# Patient Record
Sex: FEMALE
[De-identification: names, ages, dates, MRNs, and addresses within clinical notes are randomized per-mention and may not be internally consistent; named-entity substitution may affect disease eponyms.]

---

## 2018-07-23 ENCOUNTER — HOSPITAL ENCOUNTER (EMERGENCY)
Dept: HOSPITAL 14 - H.ER | Age: 23
Discharge: HOME | End: 2018-07-23
Payer: MEDICAID

## 2018-07-23 VITALS
RESPIRATION RATE: 18 BRPM | DIASTOLIC BLOOD PRESSURE: 74 MMHG | HEART RATE: 75 BPM | OXYGEN SATURATION: 98 % | SYSTOLIC BLOOD PRESSURE: 110 MMHG | TEMPERATURE: 98.3 F

## 2018-07-23 DIAGNOSIS — R30.0: Primary | ICD-10-CM

## 2018-07-23 NOTE — ED PDOC
HPI: Female  Pain


Time Seen by Provider: 07/23/18 16:05


Chief Complaint (Nursing): Female Genitourinary


Chief Complaint (Provider): Burning on urination x 1 week


History Per: Patient


History/Exam Limitations: no limitations


Onset/Duration Of Symptoms: Days


Current Symptoms Are (Timing): Still Present


Additional Complaint(s): 





21 yo female with no medical problems presents with burning on urination x 1 

week. Pt denies fever/chills. Pt denies abdominal pain or back pain. Pt denies N

/V. Pt denies vaginal discharge. Pt also states her menses is 5 days late and 

she has not taken a pregnancy test yet. 





Past Medical History





- Family History


Family History: States: Unknown Family Hx





- Allergies


Allergies/Adverse Reactions: 


 Allergies











Allergy/AdvReac Type Severity Reaction Status Date / Time


 


No Known Allergies Allergy   Verified 07/12/15 16:58














Physical Exam





- Reviewed


Nursing Documentation Reviewed: Yes


Vital Signs Reviewed: Yes





- Physical Exam


Appears: Positive for: Well, Non-toxic, No Acute Distress


Head Exam: Positive for: ATRAUMATIC, NORMAL INSPECTION, NORMOCEPHALIC


Skin: Positive for: Normal Color, Warm, DRY


Eye Exam: Positive for: Normal appearance


ENT: Positive for: Normal ENT Inspection


Neck: Positive for: Normal, Painless ROM


Cardiovascular/Chest: Positive for: Regular Rate, Rhythm


Respiratory: Positive for: Normal Breath Sounds.  Negative for: Accessory 

Muscle Use, Respiratory Distress


Back: Positive for: Normal Inspection


Extremity: Positive for: Normal ROM


Neurologic/Psych: Positive for: Alert, Oriented





Medical Decision Making


Medical Decision Making: 





Pregnancy test (-) 


urine normal. 


Urine sent for culture and GC/chlamydia





Discussed f/u with GYN. 





Disposition





- Clinical Impression


Clinical Impression: 


 Dysuria








- Patient ED Disposition


Is Patient to be Admitted: No





- Disposition


[facility] 


[facility]


Disposition: Routine/Home


Disposition Time: 17:32


Condition: STABLE


Instructions:  Dysuria, Adult (DC)


Forms:  [facility] (English)

## 2018-09-20 ENCOUNTER — HOSPITAL ENCOUNTER (EMERGENCY)
Dept: HOSPITAL 14 - H.ER | Age: 23
LOS: 1 days | Discharge: HOME | End: 2018-09-21
Payer: SELF-PAY

## 2018-09-20 VITALS — OXYGEN SATURATION: 100 %

## 2018-09-20 DIAGNOSIS — R51: ICD-10-CM

## 2018-09-20 DIAGNOSIS — O23.40: ICD-10-CM

## 2018-09-20 DIAGNOSIS — O21.9: Primary | ICD-10-CM

## 2018-09-20 PROCEDURE — 85025 COMPLETE CBC W/AUTO DIFF WBC: CPT

## 2018-09-20 PROCEDURE — 81025 URINE PREGNANCY TEST: CPT

## 2018-09-20 PROCEDURE — 87086 URINE CULTURE/COLONY COUNT: CPT

## 2018-09-20 PROCEDURE — 84702 CHORIONIC GONADOTROPIN TEST: CPT

## 2018-09-20 PROCEDURE — 81003 URINALYSIS AUTO W/O SCOPE: CPT

## 2018-09-20 PROCEDURE — 80053 COMPREHEN METABOLIC PANEL: CPT

## 2018-09-20 PROCEDURE — 99282 EMERGENCY DEPT VISIT SF MDM: CPT

## 2018-09-21 VITALS
TEMPERATURE: 98.2 F | SYSTOLIC BLOOD PRESSURE: 114 MMHG | DIASTOLIC BLOOD PRESSURE: 65 MMHG | RESPIRATION RATE: 16 BRPM | HEART RATE: 61 BPM

## 2018-09-21 LAB
ALBUMIN SERPL-MCNC: 3.9 G/DL (ref 3.5–5)
ALBUMIN/GLOB SERPL: 1.2 {RATIO} (ref 1–2.1)
ALT SERPL-CCNC: 36 U/L (ref 9–52)
AST SERPL-CCNC: 26 U/L (ref 14–36)
BACTERIA #/AREA URNS HPF: (no result) /[HPF]
BASOPHILS # BLD AUTO: 0 K/UL (ref 0–0.2)
BASOPHILS NFR BLD: 0.3 % (ref 0–2)
BILIRUB UR-MCNC: NEGATIVE MG/DL
BUN SERPL-MCNC: 8 MG/DL (ref 7–17)
CALCIUM SERPL-MCNC: 9.3 MG/DL (ref 8.4–10.2)
COLOR UR: YELLOW
EOSINOPHIL # BLD AUTO: 0.2 K/UL (ref 0–0.7)
EOSINOPHIL NFR BLD: 2.5 % (ref 0–4)
ERYTHROCYTE [DISTWIDTH] IN BLOOD BY AUTOMATED COUNT: 13.4 % (ref 11.5–14.5)
GFR NON-AFRICAN AMERICAN: > 60
GLUCOSE UR STRIP-MCNC: (no result) MG/DL
HGB BLD-MCNC: 12.1 G/DL (ref 12–16)
LEUKOCYTE ESTERASE UR-ACNC: (no result) LEU/UL
LYMPHOCYTES # BLD AUTO: 3.2 K/UL (ref 1–4.3)
LYMPHOCYTES NFR BLD AUTO: 34 % (ref 20–40)
MCH RBC QN AUTO: 30.3 PG (ref 27–31)
MCHC RBC AUTO-ENTMCNC: 34.2 G/DL (ref 33–37)
MCV RBC AUTO: 88.5 FL (ref 81–99)
MONOCYTES # BLD: 0.7 K/UL (ref 0–0.8)
MONOCYTES NFR BLD: 7.5 % (ref 0–10)
NEUTROPHILS # BLD: 5.2 K/UL (ref 1.8–7)
NEUTROPHILS NFR BLD AUTO: 55.7 % (ref 50–75)
NRBC BLD AUTO-RTO: 0 % (ref 0–0)
PH UR STRIP: 6 [PH] (ref 5–8)
PLATELET # BLD: 186 K/UL (ref 130–400)
PMV BLD AUTO: 8.5 FL (ref 7.2–11.7)
PROT UR STRIP-MCNC: 30 MG/DL
RBC # BLD AUTO: 3.99 MIL/UL (ref 3.8–5.2)
RBC # UR STRIP: NEGATIVE /UL
SP GR UR STRIP: 1.03 (ref 1–1.03)
SQUAMOUS EPITHIAL: 6 /HPF (ref 0–5)
URINE CLARITY: (no result)
UROBILINOGEN UR-MCNC: (no result) MG/DL (ref 0.2–1)
WBC # BLD AUTO: 9.4 K/UL (ref 4.8–10.8)

## 2018-09-21 NOTE — ED PDOC
HPI:  Headache


Time Seen by Provider: 09/20/18 23:56


Chief Complaint (Nursing): Headache


Chief Complaint (Provider): headache


History Per: Patient


History/Exam Limitations: no limitations


Onset/Duration Of Symptoms: Days (2 weeks), Waxing/Waning


Current Symptoms Are (Timing): Still Present


Quality: "Pain"


Associated Symptoms: Photophobia, Nausea, Vomiting


Additional Complaint(s): 





21 y/o female presents for evaluation of intermittent headaches x 2 weeks.  

Headache worsened by light, noise, and movement when present. Patient also 

reports nausea, vomiting, and dizziness; which comes with and without headache 

presentation.  Denies fever, neck pain, vision changes, extremity numbness/

weakness, chest pain, shortness of breath, palpitations, abdominal pain, 

urinary symptoms, vaginal bleeding/discharge. 





Past Medical History


Reviewed: Historical Data, Nursing Documentation, Vital Signs


Vital Signs: 





 Last Vital Signs











Temp  98.4 F   09/20/18 23:24


 


Pulse  69   09/20/18 23:24


 


Resp  18   09/20/18 23:24


 


BP  124/78   09/20/18 23:24


 


Pulse Ox  100   09/20/18 23:24














- Medical History


PMH: No Chronic Diseases





- Surgical History


Surgical History: Appendectomy, Tonsillectomy





- Family History


Family History: States: Unknown Family Hx





- Home Medications


Home Medications: 


 Ambulatory Orders











 Medication  Instructions  Recorded


 


Doxylamine/Pyridoxine HCl (B6) 1 - 2 each PO HS #16 tablet. 09/21/18





[Meaghan Roberson 10-10 mg Tablet]  


 


Nitrofurantoin Macrocrystals 100 mg PO BID #13 cap 09/21/18





[Macrobid]  














- Allergies


Allergies/Adverse Reactions: 


 Allergies











Allergy/AdvReac Type Severity Reaction Status Date / Time


 


No Known Allergies Allergy   Verified 09/20/18 23:24














Review of Systems


ROS Statement: Except As Marked, All Systems Reviewed And Found Negative


Gastrointestinal: Positive for: Nausea, Vomiting


Neurological: Positive for: Headache





Physical Exam





- Reviewed


Nursing Documentation Reviewed: Yes


Vital Signs Reviewed: Yes





- Physical Exam


Appears: Positive for: Well, Non-toxic, No Acute Distress


Head Exam: Positive for: ATRAUMATIC, NORMAL INSPECTION, NORMOCEPHALIC


Skin: Positive for: Normal Color


Eye Exam: Positive for: Normal appearance, EOMI, PERRL


ENT: Positive for: Normal ENT Inspection


Cardiovascular/Chest: Positive for: Regular Rate, Rhythm


Respiratory: Positive for: Normal Breath Sounds


Gastrointestinal/Abdominal: Positive for: Normal Exam


Back: Positive for: Normal Inspection


Extremity: Positive for: Normal ROM


Neurologic/Psych: Positive for: Alert, Oriented (x3)





- Laboratory Results


Result Diagrams: 


 09/21/18 02:00





 09/21/18 02:00





- ECG


O2 Sat by Pulse Oximetry: 100





- Progress


ED Course And Treament: 


labs, urine, IV fluids, IV zofran, PO Tylenol





Patient notified of +pregnancy


States LMP approx 4 weeks ago





On re-eval, patient states she is feeling better.  Tolerating PO


Patient educated on findings discharged with rx Macrobid (dose given in ED), 

Meaghan


Advised follow up Ob/Gyn in 2-3 days


FLuids.  [place] diet


Return precautions given





Patient demonstrates full understanding of discharge instructions.


Patient requires no further intervention in the ED and is stable for discharge 

at this time











Disposition





- Clinical Impression


Clinical Impression: 


 Headache, Nausea and vomiting during pregnancy, UTI in pregnancy








- Patient ED Disposition


Is Patient to be Admitted: No


Counseled Patient/Family Regarding: Studies Performed, Diagnosis, Need For 

Followup, Rx Given





- Disposition


Referrals[facility] 


[facility]


Disposition: Routine/Home


Disposition Time: 04:24


Condition: IMPROVED


Prescriptions: 


Doxylamine/Pyridoxine HCl (B6) [Diclegis Dr 10-10 mg Tablet] 1 - 2 each PO HS #

16 tablet.


Nitrofurantoin Macrocrystals [Macrobid] 100 mg PO BID #13 cap


Instructions:  Headache, Adult, Nausea and Vomiting of Pregnancy


Forms:  [facility] (English)

## 2018-09-26 ENCOUNTER — HOSPITAL ENCOUNTER (EMERGENCY)
Dept: HOSPITAL 14 - H.ER | Age: 23
LOS: 1 days | Discharge: HOME | End: 2018-09-27
Payer: SELF-PAY

## 2018-09-26 VITALS — OXYGEN SATURATION: 99 %

## 2018-09-26 DIAGNOSIS — O26.899: ICD-10-CM

## 2018-09-26 DIAGNOSIS — O21.1: Primary | ICD-10-CM

## 2018-09-26 LAB
ALBUMIN SERPL-MCNC: 3.6 G/DL (ref 3.5–5)
ALBUMIN/GLOB SERPL: 1.2 {RATIO} (ref 1–2.1)
ALT SERPL-CCNC: 42 U/L (ref 9–52)
AST SERPL-CCNC: 35 U/L (ref 14–36)
BACTERIA #/AREA URNS HPF: (no result) /[HPF]
BASOPHILS # BLD AUTO: 0 K/UL (ref 0–0.2)
BASOPHILS NFR BLD: 0.3 % (ref 0–2)
BILIRUB UR-MCNC: NEGATIVE MG/DL
BUN SERPL-MCNC: 9 MG/DL (ref 7–17)
CALCIUM SERPL-MCNC: 9.1 MG/DL (ref 8.4–10.2)
COLOR UR: YELLOW
EOSINOPHIL # BLD AUTO: 0.2 K/UL (ref 0–0.7)
EOSINOPHIL NFR BLD: 2.7 % (ref 0–4)
ERYTHROCYTE [DISTWIDTH] IN BLOOD BY AUTOMATED COUNT: 13.4 % (ref 11.5–14.5)
GFR NON-AFRICAN AMERICAN: > 60
GLUCOSE UR STRIP-MCNC: (no result) MG/DL
HGB BLD-MCNC: 11.1 G/DL (ref 12–16)
LEUKOCYTE ESTERASE UR-ACNC: (no result) LEU/UL
LYMPHOCYTES # BLD AUTO: 2.3 K/UL (ref 1–4.3)
LYMPHOCYTES NFR BLD AUTO: 28.1 % (ref 20–40)
MCH RBC QN AUTO: 30.7 PG (ref 27–31)
MCHC RBC AUTO-ENTMCNC: 34.7 G/DL (ref 33–37)
MCV RBC AUTO: 88.4 FL (ref 81–99)
MONOCYTES # BLD: 0.7 K/UL (ref 0–0.8)
MONOCYTES NFR BLD: 8 % (ref 0–10)
NEUTROPHILS # BLD: 5 K/UL (ref 1.8–7)
NEUTROPHILS NFR BLD AUTO: 60.9 % (ref 50–75)
NRBC BLD AUTO-RTO: 0 % (ref 0–0)
PH UR STRIP: 6 [PH] (ref 5–8)
PLATELET # BLD: 170 K/UL (ref 130–400)
PMV BLD AUTO: 8.9 FL (ref 7.2–11.7)
PROT UR STRIP-MCNC: NEGATIVE MG/DL
RBC # BLD AUTO: 3.62 MIL/UL (ref 3.8–5.2)
RBC # UR STRIP: NEGATIVE /UL
SP GR UR STRIP: 1.02 (ref 1–1.03)
SQUAMOUS EPITHIAL: 2 /HPF (ref 0–5)
URINE CLARITY: (no result)
UROBILINOGEN UR-MCNC: (no result) MG/DL (ref 0.2–1)
WBC # BLD AUTO: 8.2 K/UL (ref 4.8–10.8)

## 2018-09-26 PROCEDURE — 80053 COMPREHEN METABOLIC PANEL: CPT

## 2018-09-26 PROCEDURE — 96361 HYDRATE IV INFUSION ADD-ON: CPT

## 2018-09-26 PROCEDURE — 99283 EMERGENCY DEPT VISIT LOW MDM: CPT

## 2018-09-26 PROCEDURE — 96374 THER/PROPH/DIAG INJ IV PUSH: CPT

## 2018-09-26 PROCEDURE — 81003 URINALYSIS AUTO W/O SCOPE: CPT

## 2018-09-26 PROCEDURE — 76817 TRANSVAGINAL US OBSTETRIC: CPT

## 2018-09-26 PROCEDURE — 96375 TX/PRO/DX INJ NEW DRUG ADDON: CPT

## 2018-09-26 PROCEDURE — 85025 COMPLETE CBC W/AUTO DIFF WBC: CPT

## 2018-09-26 PROCEDURE — 87086 URINE CULTURE/COLONY COUNT: CPT

## 2018-09-26 PROCEDURE — 84702 CHORIONIC GONADOTROPIN TEST: CPT

## 2018-09-26 PROCEDURE — 86850 RBC ANTIBODY SCREEN: CPT

## 2018-09-26 PROCEDURE — 86900 BLOOD TYPING SEROLOGIC ABO: CPT

## 2018-09-26 NOTE — ED PDOC
HPI:Nausea, Vomiting, Diarrhea


Time Seen by Provider: 09/26/18 22:20


Chief Complaint (Nursing): GI Problem


Chief Complaint (Provider): pregnant: nausea/vomiting


History Per: Patient


History/Exam Limitations: no limitations


Onset/Duration Of Symptoms: Days


Current Symptoms Are (Timing): Still Present


Additional Complaint(s): 





23 y/o female, approximately 6 weeks pregnant, presents for evaluation of 

ongoing nausea, vomiting.  Patient was seen here for same last week and 

prescribed diclegis; states it was helping but she finished it and now symptoms 

have returned.  Patient also reports associated abdominal pain.  Denies fever, 

headache, dizziness, chest pain, shortness of breath, palpitations, changes in 

bowel movements, urinary symptoms, vaginal bleeding/discharge. 


Patient has appt at Ob/Gyn clinic 10/20.








Past Medical History


Reviewed: Historical Data, Nursing Documentation, Vital Signs


Vital Signs: 


                                Last Vital Signs











Temp  99.1 F   09/26/18 22:13


 


Pulse  80   09/26/18 22:13


 


Resp  16   09/26/18 22:13


 


BP  120/75   09/26/18 22:13


 


Pulse Ox  99   09/26/18 22:13














- Medical History


PMH: No Chronic Diseases





- Surgical History


Surgical History: Appendectomy, Tonsillectomy





- Family History


Family History: States: Unknown Family Hx





- Home Medications


Home Medications: 


                                Ambulatory Orders











 Medication  Instructions  Recorded


 


Doxylamine/Pyridoxine HCl (B6) 1 - 2 each PO HS #16 tablet. 09/21/18





[Meaghan Roberson 10-10 mg Tablet]  


 


Nitrofurantoin Macrocrystals 100 mg PO BID #13 cap 09/21/18





[Macrobid]  


 


Cefdinir [Omnicef] 300 mg PO BID #10 cap 09/24/18


 


Doxylamine/Pyridoxine HCl (B6) 1 - 2 each PO HS #16 tablet. 09/27/18





[Meaghan Roberson 10-10 mg Tablet]  














- Allergies


Allergies/Adverse Reactions: 


                                    Allergies











Allergy/AdvReac Type Severity Reaction Status Date / Time


 


No Known Allergies Allergy   Verified 09/26/18 22:12














Review of Systems


ROS Statement: Except As Marked, All Systems Reviewed And Found Negative


Gastrointestinal: Positive for: Nausea, Vomiting, Abdominal Pain





Physical Exam





- Reviewed


Nursing Documentation Reviewed: Yes


Vital Signs Reviewed: Yes





- Physical Exam


Appears: Positive for: Well, Non-toxic, No Acute Distress


Head Exam: Positive for: ATRAUMATIC, NORMAL INSPECTION, NORMOCEPHALIC


Skin: Positive for: Normal Color


Eye Exam: Positive for: Normal appearance


ENT: Positive for: Normal ENT Inspection


Cardiovascular/Chest: Positive for: Regular Rate, Rhythm


Respiratory: Positive for: Normal Breath Sounds


Gastrointestinal/Abdominal: Positive for: Bowel Sounds, Soft, Tenderness 

(epigastric, suprapubic)


Extremity: Positive for: Normal ROM


Neurologic/Psych: Positive for: Alert, Oriented (x3)





- Laboratory Results


Result Diagrams: 


                                 09/26/18 23:00





                                 09/26/18 23:00





- ECG


O2 Sat by Pulse Oximetry: 99





- Progress


ED Course And Treament: 


labs, urine, IV fluids, IV pepcid, IV reglan, Ob/TV u/s





USArad impression: single, live intrauterine gestation (approx 8 weeks).  No 

abnormality is seen 





On re-eval, patient states she is feeling better.  Tolerating PO


Patient educated on findings, discharged with rx Diclegis


Advised follow up OB/Gyn as scheduled


[place] diet. Encouraged increase fluid intake


Return precautions given





Patient demonstrates full understanding of discharge instructions


Patient requires no further intervention in the ED and is stable for discharge 

at this time





Disposition





- Clinical Impression


Clinical Impression: 


 Hyperemesis gravidarum, Abdominal pain during pregnancy








- Patient ED Disposition


Is Patient to be Admitted: No


Counseled Patient/Family Regarding: Studies Performed, Diagnosis, Need For 

Followup, Rx Given





- Disposition


Disposition: Routine/Home


Disposition Time: 02:45


Condition: IMPROVED


Prescriptions: 


Doxylamine/Pyridoxine HCl (B6) [Diclegis Dr 10-10 mg Tablet] 1 - 2 each PO HS 

#16 tablet.


Instructions:  Hyperemesis Gravidarum, Round Ligament Pain

## 2018-09-27 VITALS
HEART RATE: 74 BPM | TEMPERATURE: 98.3 F | DIASTOLIC BLOOD PRESSURE: 57 MMHG | RESPIRATION RATE: 18 BRPM | SYSTOLIC BLOOD PRESSURE: 114 MMHG

## 2018-09-27 NOTE — US
Date of service: 



09/26/2018



PROCEDURE:  OB Pelvic Ultrasound



HISTORY:

preg, pain



LMP: 07/29/2018



COMPARISON:

None available.



FINDINGS:



UTERUS:

Gestational sac: Single intrauterine gestation.  Measures 3.6 cm 

compatible with estimated gestational age of 8 weeks, 5 days 



Yolk sac: Measures 0.5 cm



Fetal pole: Crown-rump length measures 1.6 cm compatible with 

estimated gestational age of 8 weeks, 0 days 



Fetal Heart rate: 164 bpm.



Fetal age (Ultrasound estimated): 8 weeks, 3 days



Delores-gestational hemorrhage: None.



Date of delivery (Ultrasound estimated) : 05/05/2019



Uterus measures 10.5 x 5.4 x 6.5 cm.  Anteverted.  Normal in size and 

appearance. 



CERVIX:

Measures 4.7 cm. Long and closed. No cervical abnormality seen.



RIGHT OVARY:

Measures 3.8 x 1.9 x 2.9 cm. No mass lesion. Normal flow. 



LEFT OVARY:

Measures 2.4 x 0.9 x 2.2 cm. No solid mass. Normal flow. 



FREE FLUID:

None.



OTHER FINDINGS:

None. 



IMPRESSION:

Single live intrauterine gestation with average ultrasound age of 8 

weeks, 3 days.  Fetal heart rate 164 beats per minute.  Cervix long 

and closed.

## 2018-12-10 ENCOUNTER — HOSPITAL ENCOUNTER (OUTPATIENT)
Dept: HOSPITAL 14 - H.ER | Age: 23
Setting detail: OBSERVATION
LOS: 2 days | Discharge: HOME | End: 2018-12-12
Attending: HOSPITALIST | Admitting: HOSPITALIST
Payer: MEDICAID

## 2018-12-10 DIAGNOSIS — O36.8120: ICD-10-CM

## 2018-12-10 DIAGNOSIS — O99.012: ICD-10-CM

## 2018-12-10 DIAGNOSIS — D64.9: ICD-10-CM

## 2018-12-10 DIAGNOSIS — Z3A.19: ICD-10-CM

## 2018-12-10 DIAGNOSIS — O23.02: Primary | ICD-10-CM

## 2018-12-10 DIAGNOSIS — N13.6: ICD-10-CM

## 2018-12-10 LAB
ALBUMIN SERPL-MCNC: 3.5 G/DL (ref 3.5–5)
ALBUMIN/GLOB SERPL: 1.2 {RATIO} (ref 1–2.1)
ALT SERPL-CCNC: 34 U/L (ref 9–52)
AST SERPL-CCNC: 21 U/L (ref 14–36)
BACTERIA #/AREA URNS HPF: (no result) /[HPF]
BASOPHILS # BLD AUTO: 0 K/UL (ref 0–0.2)
BASOPHILS NFR BLD: 0.2 % (ref 0–2)
BILIRUB UR-MCNC: NEGATIVE MG/DL
BUN SERPL-MCNC: 6 MG/DL (ref 7–17)
CALCIUM SERPL-MCNC: 8.7 MG/DL (ref 8.4–10.2)
COLOR UR: YELLOW
EOSINOPHIL # BLD AUTO: 0.1 K/UL (ref 0–0.7)
EOSINOPHIL NFR BLD: 1 % (ref 0–4)
ERYTHROCYTE [DISTWIDTH] IN BLOOD BY AUTOMATED COUNT: 13.6 % (ref 11.5–14.5)
GFR NON-AFRICAN AMERICAN: > 60
GLUCOSE UR STRIP-MCNC: (no result) MG/DL
HGB BLD-MCNC: 10.8 G/DL (ref 12–16)
LEUKOCYTE ESTERASE UR-ACNC: (no result) LEU/UL
LYMPHOCYTES # BLD AUTO: 1.9 K/UL (ref 1–4.3)
LYMPHOCYTES NFR BLD AUTO: 23 % (ref 20–40)
MCH RBC QN AUTO: 31.1 PG (ref 27–31)
MCHC RBC AUTO-ENTMCNC: 34.4 G/DL (ref 33–37)
MCV RBC AUTO: 90.4 FL (ref 81–99)
MONOCYTES # BLD: 0.7 K/UL (ref 0–0.8)
MONOCYTES NFR BLD: 8.1 % (ref 0–10)
NEUTROPHILS # BLD: 5.6 K/UL (ref 1.8–7)
NEUTROPHILS NFR BLD AUTO: 67.7 % (ref 50–75)
NRBC BLD AUTO-RTO: 0 % (ref 0–0)
PH UR STRIP: 6 [PH] (ref 5–8)
PLATELET # BLD: 147 K/UL (ref 130–400)
PMV BLD AUTO: 8.8 FL (ref 7.2–11.7)
PROT UR STRIP-MCNC: 100 MG/DL
RBC # BLD AUTO: 3.46 MIL/UL (ref 3.8–5.2)
RBC # UR STRIP: (no result) /UL
SP GR UR STRIP: 1.02 (ref 1–1.03)
SQUAMOUS EPITHIAL: 3 /HPF (ref 0–5)
URINE CLARITY: (no result)
UROBILINOGEN UR-MCNC: 2 MG/DL (ref 0.2–1)
WBC # BLD AUTO: 8.3 K/UL (ref 4.8–10.8)

## 2018-12-10 PROCEDURE — 87591 N.GONORRHOEAE DNA AMP PROB: CPT

## 2018-12-10 PROCEDURE — 76770 US EXAM ABDO BACK WALL COMP: CPT

## 2018-12-10 PROCEDURE — 96365 THER/PROPH/DIAG IV INF INIT: CPT

## 2018-12-10 PROCEDURE — 86850 RBC ANTIBODY SCREEN: CPT

## 2018-12-10 PROCEDURE — 85025 COMPLETE CBC W/AUTO DIFF WBC: CPT

## 2018-12-10 PROCEDURE — 87491 CHLMYD TRACH DNA AMP PROBE: CPT

## 2018-12-10 PROCEDURE — 87181 SC STD AGAR DILUTION PER AGT: CPT

## 2018-12-10 PROCEDURE — 86900 BLOOD TYPING SEROLOGIC ABO: CPT

## 2018-12-10 PROCEDURE — 84702 CHORIONIC GONADOTROPIN TEST: CPT

## 2018-12-10 PROCEDURE — 80053 COMPREHEN METABOLIC PANEL: CPT

## 2018-12-10 PROCEDURE — 99285 EMERGENCY DEPT VISIT HI MDM: CPT

## 2018-12-10 PROCEDURE — 81025 URINE PREGNANCY TEST: CPT

## 2018-12-10 PROCEDURE — 87086 URINE CULTURE/COLONY COUNT: CPT

## 2018-12-10 PROCEDURE — 36415 COLL VENOUS BLD VENIPUNCTURE: CPT

## 2018-12-10 PROCEDURE — 81003 URINALYSIS AUTO W/O SCOPE: CPT

## 2018-12-10 PROCEDURE — 80048 BASIC METABOLIC PNL TOTAL CA: CPT

## 2018-12-10 PROCEDURE — 76815 OB US LIMITED FETUS(S): CPT

## 2018-12-10 NOTE — ED PDOC
HPI: Abdomen


Time Seen by Provider: 12/10/18 17:54


Chief Complaint (Nursing): Abdominal Pain


Chief Complaint (Provider): Abdominal Pain


History Per: Patient


History/Exam Limitations: no limitations


Onset/Duration Of Symptoms: Days (x 5)


Current Symptoms Are (Timing): Still Present


Location Of Pain/Discomfort: RLQ


Quality Of Discomfort: Cramping, "Pain", Other (pulling)


Associated Symptoms: Vomiting


Exacerbating Factors: Movement


Additional Complaint(s): 


22 year old, 19 week pregnant female presents to the ED with RLQ pain for five 

days. Patient describes pain as pulling. It radiates to her right flank and 

worsens with certain movements of her trunk. She reports vaginal spotting that 

began three days ago and spontaneously resolved today. Patient has experienced 

abdominal cramping and vomiting throughout pregnancy. This pain is different. 

Patient also states she has a history of frequent UTIs since she was a teenager 

with the most recent diagnosis 1 week ago. She was advised to drink plenty of 

fluids. Since, patient visited PMD and had urine rechecked. Infection seemed to 

have improved, but she was advised to come here for further evaluation. Denies 

dysuria, hematuria, blood clots and fever.  





PMD: [facility] 





Last Menstral Period: 2018


: 5


Para: 2


Miscarriage: 1





Past Medical History


Reviewed: Historical Data, Nursing Documentation, Vital Signs


Vital Signs: 





                                Last Vital Signs











Temp  97.8 F   12/10/18 16:53


 


Pulse  85   12/10/18 16:53


 


Resp  18   12/10/18 16:53


 


BP  107/61   12/10/18 16:53


 


Pulse Ox  99   12/10/18 16:53














- Medical History


PMH: Anemia


   Denies: Chronic Kidney Disease





- Surgical History


Surgical History: Appendectomy, Tonsillectomy





- Family History


Family History: States: No Known Family Hx





- Social History


Current smoker - smoking cessation education provided: No





- Immunization History


Hx Tetanus Toxoid Vaccination: Yes


Hx Influenza Vaccination: Yes


Hx Pneumococcal Vaccination: No





- Home Medications


Home Medications: 


                                Ambulatory Orders











 Medication  Instructions  Recorded


 


Prenatal Vit No.138/Folic/Dha 1 each PO DAILY 12/10/18





[Alive Prenatal Gummy]  














- Allergies


Allergies/Adverse Reactions: 


                                    Allergies











Allergy/AdvReac Type Severity Reaction Status Date / Time


 


No Known Allergies Allergy   Verified 18 22:12














Review of Systems


ROS Statement: Except As Marked, All Systems Reviewed And Found Negative


Constitutional: Negative for: Fever


Gastrointestinal: Positive for: Vomiting, Abdominal Pain (RLQ)


Genitourinary Female: Negative for: Dysuria, Hematuria, Vaginal Bleeding (blood 

clots)


Musculoskeletal: Positive for: Back Pain (pain radiates to right flank)





Physical Exam





- Reviewed


Nursing Documentation Reviewed: Yes


Vital Signs Reviewed: Yes





- Physical Exam


Appears: Positive for: No Acute Distress


Head Exam: Positive for: ATRAUMATIC, NORMAL INSPECTION, NORMOCEPHALIC


Skin: Positive for: Normal Color, Warm, Dry


Eye Exam: Positive for: EOMI, Normal appearance, PERRL


ENT: Positive for: Pharynx Is (clear mucus membranes moist)


Neck: Positive for: Painless ROM, Supple


Cardiovascular/Chest: Positive for: Regular Rate, Rhythm.  Negative for: Murmur


Respiratory: Positive for: Normal Breath Sounds.  Negative for: Respiratory 

Distress


Gastrointestinal/Abdominal: Positive for: Soft, Tenderness (RLQ tenderness to 

palpation), Other (gravid uterus below umbilicus).  Negative for: Mass, 

Guarding, Rebound


Back: Positive for: R CVA Tenderness.  Negative for: L CVA Tenderness


Extremity: Positive for: Normal ROM.  Negative for: Deformity


Lymphatic: Negative for: Adenopathy


Neurologic/Psych: Positive for: Alert.  Negative for: Motor/Sensory Deficits





- Laboratory Results


Result Diagrams: 


                                 12/10/18 18:15





                                 12/10/18 18:15





- ECG


O2 Sat by Pulse Oximetry: 99 (RA)


Pulse Ox Interpretation: Normal





Medical Decision Making


Medical Decision Makin:20 


Impression: RLQ pain 


Differential diagnoses include but are not limited to: round ligament pain, UTI,

cystitis, pyelonephritis, renal colic 


Initial Plan: 


--Blood type 


--Beta-HCG 


--CMP 


--Urine preg 


--Urine dip 


--CBC 


--Chlamydia 


--Lactated Ringers IV 1,000 mls 


--Tylenol 975 mg PO 


--Urine cx 


--UA 


--OB Transvag US 


--Renal US 





20:30 


Renal US 


COMMENTS: 


The right kidney measures 10.08 x 6.47 x 6.37 cm and the left kidney measures 

10.38 x 4.49 x 4.88 cm.  Mild to moderate right hydronephrosis is noted.  There 

is no evidence of solid or cystic mass.  There is no perinephric fluid.  There 

is no renal calculus.   





IMPRESSION: 


Mild to moderate right hydronephrosis is noted.  Consider follow up with CT.





20:40 


OB US 


COMMENTS: 


There is a single intrauterine gestation, cephalic presentation.   





The BPD measures 4.44 cm corresponds to a gestational age of 19 weeks 3 days.   


The AC measures 14.43 cm corresponds to a gestational age of 19 weeks 5 days.   


The HC measures 16.8 cm corresponds to a gestational age of 19 weeks 3 days.   


The FL measures 3.23 cm corresponds to a gestational age of 20 weeks 0 days.   


The mean ultrasound age is 19 weeks 5 days. 





Fetal heart motion was observed.  The heart rate is 138 beats per minute. 





Fetal motion is identified. 





The placenta is posterior and free of the cervical os. 





The cervical length is 4.76 cm. 





The uterus shows no evidence of mass.  Right ovary measures 5.05 x 2.92 x 2.6 cm

with volume of 20.04 cc.  The left ovary measures 3.17 x 2.17 x 2.42 cm with 

volume of 8.7 cc. 





No free fluid is noted in the cul-de-sac. 





IMPRESSION: 





Single, live, intrauterine gestation with a composite gestational age of 19 

weeks 5 days.





20:50 


Discussed findings with patient. Patient's results and clinical presentation are

significant for hydronephrosis and pyelonephritis. 


In setting of pregnancy, need for observation and antibiotics to manage with 

concern for sepsis 


Referred to Dr. Holland, hospitalist, for clinic. 


[facility] for consult


. 


-------------------------------------

------------------------------------------------------------


Scribe Attestation:


Documented by Janessa Oseguera, acting as a scribe for Stephanie Winkler MD 





Provider Scribe Attestation:


All medical record entries made by the Scribe were at my direction and 

personally dictated by me. I have reviewed the chart and agree that the record 

accurately reflects my personal performance of the history, physical exam, 

medical decision making, and the department course for this patient. I have also

personally directed, reviewed, and agree with the discharge instructions and 

disposition.





Disposition





- Clinical Impression


Clinical Impression: 


 Pyelonephritis








- Patient ED Disposition


Is Patient to be Admitted: Yes





- Disposition


Disposition Time: 20:50


Condition: FAIR





- Pt Status Changed To:


Hospital Disposition Of: Observation





- POA


Present On Arrival: None

## 2018-12-10 NOTE — CP.PCM.CON
<Lobito Bañuelos - Last Filed: 12/10/18 22:55>





History of Present Illness





- History of Present Illness


History of Present Illness: 





This is a 22 years old  with IUP at EGA 19.5 weeks, who presents to the 

ED with c/o mild to moderate Right flank pain for 5 days, is intermittent, with 

occasional radiation to the right lower back, described by patient as dull 

pulling pain, associated with chills and some occasional dysuria, although 

patient states she does not have dysuria at this time. Patient reports h/o UTI 

episodes during this pregnancy that have been treated, the last episode a week 

ago and patient states she is not on any treatment at this time. Patient also re

ports intermittent vaginal spotting since the beginning of this pregnancy, but 

states that she is not having BV at this time. Patient reports +FM. Patient 

denies HA, blurry vision, abdominal cramps or CONTX, LOF, N/V/D, fever, dysuria 

or hematuria at this time.





ROS: unremarkable, except as per HPI


Prenatal Provider: [facility]


PMHx: Anemia


FMHx: Mother DM and Bone CA


SURGHx: Tonsillectomy, Appendectomy


OBGYN: Patient denies h/o STI, :  full term , IAB . Patient 

reports LMP 18.


SOCHx: Denies ETOH/Smoking/rec DRUGs


ALLERG: NKA


MEDS: Prenatal VIT





Past Patient History





- Infectious Disease


Hx of Infectious Diseases: None





- Past Social History


Smoking Status: Never Smoked





- CARDIAC


Hx Cardiac Disorders: No





- PULMONARY


Hx Respiratory Disorders: No





- NEUROLOGICAL


Hx Neurological Disorder: No





- HEENT


Hx HEENT Problems: No





- RENAL


Hx Chronic Kidney Disease: No





- ENDOCRINE/METABOLIC


Hx Endocrine Disorders: No





- HEMATOLOGICAL/ONCOLOGICAL


Hx Anemia: Yes





- INTEGUMENTARY


Hx Dermatological Problems: No





- MUSCULOSKELETAL/RHEUMATOLOGICAL


Hx Musculoskeletal Disorders: No





- GASTROINTESTINAL


Hx Gastrointestinal Disorders: No





- GENITOURINARY/GYNECOLOGICAL


Hx Genitourinary Disorders: No





- PSYCHIATRIC


Hx Psychophysiologic Disorder: No


Hx Substance Use: No





- SURGICAL HISTORY


Hx Appendectomy: Yes


Hx Tonsillectomy: Yes





- ANESTHESIA


Hx Anesthesia: Yes


Hx Anesthesia Reactions: No





Meds


Allergies/Adverse Reactions: 


                                    Allergies











Allergy/AdvReac Type Severity Reaction Status Date / Time


 


No Known Allergies Allergy   Verified 18 22:12














Physical Exam





- Constitutional


Appears: Non-toxic, No Acute Distress





- Head Exam


Head Exam: ATRAUMATIC, NORMOCEPHALIC





- Eye Exam


Eye Exam: EOMI





- ENT Exam


ENT Exam: Mucous Membranes Moist





- Neck Exam


Neck exam: Positive for: Full Rom





- Respiratory Exam


Respiratory Exam: Clear to Auscultation Bilateral





- Cardiovascular Exam


Cardiovascular Exam: REGULAR RHYTHM, +S1, +S2





- GI/Abdominal Exam


GI & Abdominal Exam: Normal Bowel Sounds, Soft





-  Exam


Speculum exam: absent: Vaginal Bleeding


Bimanual exam: absent: Cervical Motion Tendernes


Additional comments: 





Pelvic exam performed by Attending Dr Hercules at bedside.


Cervix found closed and long on examination.





- Extremities Exam


Extremities exam: Positive for: normal inspection.  Negative for: pedal edema





- Back Exam


Back exam: CVA tenderness (R)





- Neurological Exam


Neurological exam: Alert, Oriented x3





- Skin


Skin Exam: Normal Color, Warm





Results





- Vital Signs


Recent Vital Signs: 


                                Last Vital Signs











Temp  97.8 F   12/10/18 16:53


 


Pulse  85   12/10/18 16:53


 


Resp  18   12/10/18 16:53


 


BP  107/61   12/10/18 16:53


 


Pulse Ox  99   12/10/18 21:00














- Labs


Result Diagrams: 


                                 12/10/18 18:15





                                 12/10/18 18:15


Labs: 


                         Laboratory Results - last 24 hr











  12/10/18 12/10/18 12/10/18





  18:15 18:15 18:15


 


WBC  8.3  


 


RBC  3.46 L  


 


Hgb  10.8 L  


 


Hct  31.3 L  


 


MCV  90.4  D  


 


MCH  31.1 H  


 


MCHC  34.4  


 


RDW  13.6  


 


Plt Count  147  


 


MPV  8.8  


 


Neut % (Auto)  67.7  


 


Lymph % (Auto)  23.0  


 


Mono % (Auto)  8.1  


 


Eos % (Auto)  1.0  


 


Baso % (Auto)  0.2  


 


Neut # (Auto)  5.6  


 


Lymph # (Auto)  1.9  


 


Mono # (Auto)  0.7  


 


Eos # (Auto)  0.1  


 


Baso # (Auto)  0.0  


 


Sodium   137 


 


Potassium   3.6 


 


Chloride   105 


 


Carbon Dioxide   23 


 


Anion Gap   13 


 


BUN   6 L 


 


Creatinine   0.5 L 


 


Est GFR ( Amer)   > 60 


 


Est GFR (Non-Af Amer)   > 60 


 


Random Glucose   76 


 


Calcium   8.7 


 


Total Bilirubin   0.1 L 


 


AST   21 


 


ALT   34 


 


Alkaline Phosphatase   40 


 


Total Protein   6.5 


 


Albumin   3.5 


 


Globulin   3.0 


 


Albumin/Globulin Ratio   1.2 


 


Beta HCG, Quant   > 89501.00 


 


Urine Color   


 


Urine Clarity   


 


Urine pH   


 


Ur Specific Gravity   


 


Urine Protein   


 


Urine Glucose (UA)   


 


Urine Ketones   


 


Urine Blood   


 


Urine Nitrate   


 


Urine Bilirubin   


 


Urine Urobilinogen   


 


Ur Leukocyte Esterase   


 


Urine RBC (Auto)   


 


Urine Microscopic WBC   


 


Ur Squamous Epith Cells   


 


Urine Bacteria   


 


Blood Type    B POSITIVE


 


Antibody Screen    Negative


 


BBK History Checked    Patient has bt














  12/10/18





  18:20


 


WBC 


 


RBC 


 


Hgb 


 


Hct 


 


MCV 


 


MCH 


 


MCHC 


 


RDW 


 


Plt Count 


 


MPV 


 


Neut % (Auto) 


 


Lymph % (Auto) 


 


Mono % (Auto) 


 


Eos % (Auto) 


 


Baso % (Auto) 


 


Neut # (Auto) 


 


Lymph # (Auto) 


 


Mono # (Auto) 


 


Eos # (Auto) 


 


Baso # (Auto) 


 


Sodium 


 


Potassium 


 


Chloride 


 


Carbon Dioxide 


 


Anion Gap 


 


BUN 


 


Creatinine 


 


Est GFR ( Amer) 


 


Est GFR (Non-Af Amer) 


 


Random Glucose 


 


Calcium 


 


Total Bilirubin 


 


AST 


 


ALT 


 


Alkaline Phosphatase 


 


Total Protein 


 


Albumin 


 


Globulin 


 


Albumin/Globulin Ratio 


 


Beta HCG, Quant 


 


Urine Color  Yellow


 


Urine Clarity  Turbid


 


Urine pH  6.0


 


Ur Specific Gravity  1.021


 


Urine Protein  100


 


Urine Glucose (UA)  Neg


 


Urine Ketones  Negative


 


Urine Blood  Small


 


Urine Nitrate  Negative


 


Urine Bilirubin  Negative


 


Urine Urobilinogen  2.0 H


 


Ur Leukocyte Esterase  Large


 


Urine RBC (Auto)  13 H


 


Urine Microscopic WBC  781 H


 


Ur Squamous Epith Cells  3


 


Urine Bacteria  Rare


 


Blood Type 


 


Antibody Screen 


 


BBK History Checked 














Assessment & Plan





- Assessment and Plan (Free Text)


Assessment: 





22 years old  with IUP at EGA 19.5 weeks, seen in consult in the ED with 

c/o Right flank pain and right lower back pain for 5 days associated with chills

and occasional dysuria in the past days admitted by hospitalist team for 

pyelonephritis. 


Patient was examined, Labs and MR reviewed.


Plan:


-F/u UA and UC 


-F/u Chlamydia/GC test


-Rocephin 1G IV, first dose give in the ED


-Obtain an OB Transvaginal US, with special attention to cervical length due to 

her c/o intermittent vaginal spotting during this pregnancy. 


Case discussed with attending Dr Hercules


Thank you for this interesting consult





<Ranjana Hercules - Last Filed: 18 07:00>





Meds





- Medications


Medications: 


                               Current Medications





Acetaminophen (Tylenol 325mg Tab)  650 mg PO Q6 PRN


   PRN Reason: Pain, moderate (4-7)


Sodium Chloride (Sodium Chloride 0.9%)  1,000 mls @ 125 mls/hr IV .Q8H [facility]SES


   Last Admin: 12/10/18 23:34 Dose:  125 mls/hr


Ceftriaxone Sodium 1 gm/ (Sodium Chloride)  100 mls @ 100 mls/hr IVPB DAILY [facility];

Protocol


Ondansetron HCl (Zofran Inj)  4 mg IVP Q6 PRN


   PRN Reason: Nausea/Vomiting


Pantoprazole Sodium (Protonix Ec Tab)  40 mg PO DAILY [facility]


Prenatal Multivit/Folic Acid/Iron (Prenatal)  1 tab PO DAILY [facility]











Results





- Vital Signs


Recent Vital Signs: 


                                Last Vital Signs











Temp  97.8 F   18 00:20


 


Pulse  81   18 01:14


 


Resp  17   18 01:14


 


BP  100/60   18 00:20


 


Pulse Ox  95   18 01:14














- Labs


Result Diagrams: 


                                 18 05:55





                                 18 05:55


Labs: 


                         Laboratory Results - last 24 hr











  12/10/18 12/10/18 12/10/18





  18:15 18:15 18:15


 


WBC  8.3  


 


RBC  3.46 L  


 


Hgb  10.8 L  


 


Hct  31.3 L  


 


MCV  90.4  D  


 


MCH  31.1 H  


 


MCHC  34.4  


 


RDW  13.6  


 


Plt Count  147  


 


MPV  8.8  


 


Neut % (Auto)  67.7  


 


Lymph % (Auto)  23.0  


 


Mono % (Auto)  8.1  


 


Eos % (Auto)  1.0  


 


Baso % (Auto)  0.2  


 


Neut # (Auto)  5.6  


 


Lymph # (Auto)  1.9  


 


Mono # (Auto)  0.7  


 


Eos # (Auto)  0.1  


 


Baso # (Auto)  0.0  


 


Sodium   137 


 


Potassium   3.6 


 


Chloride   105 


 


Carbon Dioxide   23 


 


Anion Gap   13 


 


BUN   6 L 


 


Creatinine   0.5 L 


 


Est GFR ( Amer)   > 60 


 


Est GFR (Non-Af Amer)   > 60 


 


Random Glucose   76 


 


Calcium   8.7 


 


Total Bilirubin   0.1 L 


 


AST   21 


 


ALT   34 


 


Alkaline Phosphatase   40 


 


Total Protein   6.5 


 


Albumin   3.5 


 


Globulin   3.0 


 


Albumin/Globulin Ratio   1.2 


 


Beta HCG, Quant   > 25949.00 


 


Urine Color   


 


Urine Clarity   


 


Urine pH   


 


Ur Specific Gravity   


 


Urine Protein   


 


Urine Glucose (UA)   


 


Urine Ketones   


 


Urine Blood   


 


Urine Nitrate   


 


Urine Bilirubin   


 


Urine Urobilinogen   


 


Ur Leukocyte Esterase   


 


Urine RBC (Auto)   


 


Urine Microscopic WBC   


 


Ur Squamous Epith Cells   


 


Urine Bacteria   


 


Blood Type    B POSITIVE


 


Antibody Screen    Negative


 


BBK History Checked    Patient has bt














  12/10/18 12/11/18 12/11/18





  18:20 05:55 05:55


 


WBC   6.8 


 


RBC   3.08 L 


 


Hgb   9.7 L 


 


Hct   27.9 L 


 


MCV   90.6 


 


MCH   31.5 H 


 


MCHC   34.8 


 


RDW   13.5 


 


Plt Count   146 


 


MPV   8.9 


 


Neut % (Auto)   56.6 


 


Lymph % (Auto)   31.5 


 


Mono % (Auto)   9.9 


 


Eos % (Auto)   1.7 


 


Baso % (Auto)   0.3 


 


Neut # (Auto)   3.8 


 


Lymph # (Auto)   2.1 


 


Mono # (Auto)   0.7 


 


Eos # (Auto)   0.1 


 


Baso # (Auto)   0.0 


 


Sodium    136


 


Potassium    3.4 L


 


Chloride    109 H


 


Carbon Dioxide    23


 


Anion Gap    7 L


 


BUN    5 L


 


Creatinine    0.5 L


 


Est GFR ( Amer)    > 60


 


Est GFR (Non-Af Amer)    > 60


 


Random Glucose    91


 


Calcium    8.3 L


 


Total Bilirubin   


 


AST   


 


ALT   


 


Alkaline Phosphatase   


 


Total Protein   


 


Albumin   


 


Globulin   


 


Albumin/Globulin Ratio   


 


Beta HCG, Quant   


 


Urine Color  Yellow  


 


Urine Clarity  Turbid  


 


Urine pH  6.0  


 


Ur Specific Gravity  1.021  


 


Urine Protein  100  


 


Urine Glucose (UA)  Neg  


 


Urine Ketones  Negative  


 


Urine Blood  Small  


 


Urine Nitrate  Negative  


 


Urine Bilirubin  Negative  


 


Urine Urobilinogen  2.0 H  


 


Ur Leukocyte Esterase  Large  


 


Urine RBC (Auto)  13 H  


 


Urine Microscopic WBC  781 H  


 


Ur Squamous Epith Cells  3  


 


Urine Bacteria  Rare  


 


Blood Type   


 


Antibody Screen   


 


BBK History Checked   














Assessment & Plan





- Assessment and Plan (Free Text)


Assessment: 


OB Hospitalist Addendum: Pt seen and examined by me.  Agree w/ above.  23 yo 

 at 19+ wks w/ recurrent UTIs w/ this pregnancy now admitted to medical 

hospitalist service w/ right upper quadrant pain that radiates to the back, w/ 

suspected pyelonephritis.  Pt also reports spotting for a few days and on and 

off since with the pregnancy. On speculum exam, there was no blood in vagina.  

Cervix was closed/ thick and high.  Cervical length was done w/ u/s of pregnancy

and was 4.76 cm.  Plan as above.  (ES)

## 2018-12-11 VITALS — HEART RATE: 70 BPM

## 2018-12-11 LAB
BASOPHILS # BLD AUTO: 0 K/UL (ref 0–0.2)
BASOPHILS NFR BLD: 0.3 % (ref 0–2)
BUN SERPL-MCNC: 5 MG/DL (ref 7–17)
CALCIUM SERPL-MCNC: 8.3 MG/DL (ref 8.4–10.2)
EOSINOPHIL # BLD AUTO: 0.1 K/UL (ref 0–0.7)
EOSINOPHIL NFR BLD: 1.7 % (ref 0–4)
ERYTHROCYTE [DISTWIDTH] IN BLOOD BY AUTOMATED COUNT: 13.5 % (ref 11.5–14.5)
GFR NON-AFRICAN AMERICAN: > 60
HGB BLD-MCNC: 9.7 G/DL (ref 12–16)
LYMPHOCYTES # BLD AUTO: 2.1 K/UL (ref 1–4.3)
LYMPHOCYTES NFR BLD AUTO: 31.5 % (ref 20–40)
MCH RBC QN AUTO: 31.5 PG (ref 27–31)
MCHC RBC AUTO-ENTMCNC: 34.8 G/DL (ref 33–37)
MCV RBC AUTO: 90.6 FL (ref 81–99)
MONOCYTES # BLD: 0.7 K/UL (ref 0–0.8)
MONOCYTES NFR BLD: 9.9 % (ref 0–10)
NEUTROPHILS # BLD: 3.8 K/UL (ref 1.8–7)
NEUTROPHILS NFR BLD AUTO: 56.6 % (ref 50–75)
NRBC BLD AUTO-RTO: 0.1 % (ref 0–0)
PLATELET # BLD: 146 K/UL (ref 130–400)
PMV BLD AUTO: 8.9 FL (ref 7.2–11.7)
RBC # BLD AUTO: 3.08 MIL/UL (ref 3.8–5.2)
WBC # BLD AUTO: 6.8 K/UL (ref 4.8–10.8)

## 2018-12-11 RX ADMIN — PRENATAL VIT W/ FE FUMARATE-FA TAB 27-0.8 MG SCH TAB: 27-0.8 TAB at 08:27

## 2018-12-11 NOTE — CP.PCM.HP
History of Present Illness





- History of Present Illness


History of Present Illness: 





CC: RLQ pain and Right flank pain





HPI: 22 years old  with IUP @19.5 weeks of GA with PMHx anemia presented 

to [facility] ED with c/o intermittent RLQ pain x 5 days with occasional radiation to 

the right lower back.  Patient reports she has intermittent dysuria since the 

beginning of pregnancy and was treated with multiple po abx. Patient does 

reports feeling chills but denies fever, nausea, vomiting, headache, dizziness 

or blurry vision. Patient also reports intermittent vaginal spotting for few 

days but states that she is not having BV at this time. Patient reports +FM. 

Patient denies HA, blurry vision, CTX, LOF, N/V/D, fever or hematuria at this 

time. Denies any hx pyelonephritis or renal stone in the past. 





ROS: all 12 systems reviewed and negative except as mentioned in HPI.





[facility]:  [place]





PMHx: Anemia


SHx: Tonsillectomy, Appendectomy


Family Hx: Mother: DMII, CA, MI, renal stone. Sister: renal stone


Social hx Denies ETOH/Smoking/rec DRUGs


ALLERG: NKDA


MEDS: Prenatal VIT





Present on Admission





- Present on Admission


Any Indicators Present on Admission: No





Review of Systems





- Review of Systems


All systems: reviewed and no additional remarkable complaints except (as 

mentioned in HPI)





Past Patient History





- Infectious Disease


Hx of Infectious Diseases: None





- Past Social History


Smoking Status: Never Smoked





- CARDIAC


Hx Cardiac Disorders: No





- PULMONARY


Hx Respiratory Disorders: No





- NEUROLOGICAL


Hx Neurological Disorder: No





- HEENT


Hx HEENT Problems: No





- RENAL


Hx Chronic Kidney Disease: No





- ENDOCRINE/METABOLIC


Hx Endocrine Disorders: No





- HEMATOLOGICAL/ONCOLOGICAL


Hx Blood Disorders: Yes (ANEMIA)





- INTEGUMENTARY


Hx Dermatological Problems: No





- MUSCULOSKELETAL/RHEUMATOLOGICAL


Hx Musculoskeletal Disorders: No





- GASTROINTESTINAL


Hx Gastrointestinal Disorders: No





- GENITOURINARY/GYNECOLOGICAL


Hx Genitourinary Disorders: No





- PSYCHIATRIC


Hx Psychophysiologic Disorder: No





- SURGICAL HISTORY


Hx Appendectomy: Yes


Hx Tonsillectomy: Yes





- ANESTHESIA


Hx Anesthesia: Yes


Hx Anesthesia Reactions: No





Meds


Allergies/Adverse Reactions: 


                                    Allergies











Allergy/AdvReac Type Severity Reaction Status Date / Time


 


No Known Allergies Allergy   Verified 18 22:12














Physical Exam





- Constitutional


Appears: Non-toxic, Other (Looks uncomfortable due to pain)





- Head Exam


Head Exam: ATRAUMATIC, NORMOCEPHALIC





- Eye Exam


Eye Exam: EOMI, Normal appearance, PERRL





- ENT Exam


ENT Exam: Mucous Membranes Moist, Normal Oropharynx





- Neck Exam


Neck exam: Positive for: Normal Inspection.  Negative for: Meningismus





- Respiratory Exam


Respiratory Exam: Clear to Auscultation Bilateral, NORMAL BREATHING PATTERN.  

absent: Rhonchi, Wheezes





- Cardiovascular Exam


Cardiovascular Exam: REGULAR RHYTHM, +S1, +S2





- GI/Abdominal Exam


GI & Abdominal Exam: Normal Bowel Sounds, Soft, Tenderness (mild RLQ tendernes

s).  absent: Guarding, Rebound, Rigid





- Back Exam


Back exam: CVA tenderness (R).  absent: CVA tenderness (L)





- Neurological Exam


Neurological exam: Alert, CN II-XII Intact, Oriented x3





- Psychiatric Exam


Psychiatric exam: Normal Affect, Normal Mood





- Skin


Skin Exam: Normal Color, Warm





Results





- Vital Signs


Recent Vital Signs: 





                                Last Vital Signs











Temp  98.5 F   12/10/18 23:37


 


Pulse  67   12/10/18 23:37


 


Resp  18   12/10/18 23:37


 


BP  113/56 L  12/10/18 23:37


 


Pulse Ox  100   12/10/18 23:37














- Labs


Result Diagrams: 


                                 12/10/18 18:15





                                 12/10/18 18:15


Labs: 





                         Laboratory Results - last 24 hr











  12/10/18 12/10/18 12/10/18





  18:15 18:15 18:15


 


WBC  8.3  


 


RBC  3.46 L  


 


Hgb  10.8 L  


 


Hct  31.3 L  


 


MCV  90.4  D  


 


MCH  31.1 H  


 


MCHC  34.4  


 


RDW  13.6  


 


Plt Count  147  


 


MPV  8.8  


 


Neut % (Auto)  67.7  


 


Lymph % (Auto)  23.0  


 


Mono % (Auto)  8.1  


 


Eos % (Auto)  1.0  


 


Baso % (Auto)  0.2  


 


Neut # (Auto)  5.6  


 


Lymph # (Auto)  1.9  


 


Mono # (Auto)  0.7  


 


Eos # (Auto)  0.1  


 


Baso # (Auto)  0.0  


 


Sodium   137 


 


Potassium   3.6 


 


Chloride   105 


 


Carbon Dioxide   23 


 


Anion Gap   13 


 


BUN   6 L 


 


Creatinine   0.5 L 


 


Est GFR ( Amer)   > 60 


 


Est GFR (Non-Af Amer)   > 60 


 


Random Glucose   76 


 


Calcium   8.7 


 


Total Bilirubin   0.1 L 


 


AST   21 


 


ALT   34 


 


Alkaline Phosphatase   40 


 


Total Protein   6.5 


 


Albumin   3.5 


 


Globulin   3.0 


 


Albumin/Globulin Ratio   1.2 


 


Beta HCG, Quant   > 24317.00 


 


Urine Color   


 


Urine Clarity   


 


Urine pH   


 


Ur Specific Gravity   


 


Urine Protein   


 


Urine Glucose (UA)   


 


Urine Ketones   


 


Urine Blood   


 


Urine Nitrate   


 


Urine Bilirubin   


 


Urine Urobilinogen   


 


Ur Leukocyte Esterase   


 


Urine RBC (Auto)   


 


Urine Microscopic WBC   


 


Ur Squamous Epith Cells   


 


Urine Bacteria   


 


Blood Type    B POSITIVE


 


Antibody Screen    Negative


 


BBK History Checked    Patient has bt














  12/10/18





  18:20


 


WBC 


 


RBC 


 


Hgb 


 


Hct 


 


MCV 


 


MCH 


 


MCHC 


 


RDW 


 


Plt Count 


 


MPV 


 


Neut % (Auto) 


 


Lymph % (Auto) 


 


Mono % (Auto) 


 


Eos % (Auto) 


 


Baso % (Auto) 


 


Neut # (Auto) 


 


Lymph # (Auto) 


 


Mono # (Auto) 


 


Eos # (Auto) 


 


Baso # (Auto) 


 


Sodium 


 


Potassium 


 


Chloride 


 


Carbon Dioxide 


 


Anion Gap 


 


BUN 


 


Creatinine 


 


Est GFR ( Amer) 


 


Est GFR (Non-Af Amer) 


 


Random Glucose 


 


Calcium 


 


Total Bilirubin 


 


AST 


 


ALT 


 


Alkaline Phosphatase 


 


Total Protein 


 


Albumin 


 


Globulin 


 


Albumin/Globulin Ratio 


 


Beta HCG, Quant 


 


Urine Color  Yellow


 


Urine Clarity  Turbid


 


Urine pH  6.0


 


Ur Specific Gravity  1.021


 


Urine Protein  100


 


Urine Glucose (UA)  Neg


 


Urine Ketones  Negative


 


Urine Blood  Small


 


Urine Nitrate  Negative


 


Urine Bilirubin  Negative


 


Urine Urobilinogen  2.0 H


 


Ur Leukocyte Esterase  Large


 


Urine RBC (Auto)  13 H


 


Urine Microscopic WBC  781 H


 


Ur Squamous Epith Cells  3


 


Urine Bacteria  Rare


 


Blood Type 


 


Antibody Screen 


 


BBK History Checked 














Assessment & Plan





- Assessment and Plan (Free Text)


Assessment: 





22 years old  with IUP @19.5 weeks of GA with PMHx anemia presented to 

[facility] ED with c/o intermittent RLQ pain x 5 days with occasional radiation to the

right lower back.  Patient reports she has intermittent dysuria since the 

beginning of pregnancy and was treated with multiple po abx.  Renal US showed 

mild to moderate right hydronephrosis. Patient is admitted for acute complicated

UTI.








Plan:








Acute complicated Urinary Tract Infection


-Afebrile, stable vitals


-Wbc 8.3


-Renal US showed mild to moderate right hydronephrosis


-s/p 1 gm ceftriaxone in ER


-c/w Ceftriaxone 1 gm q24 hrs


-IVFs LR @125 cc/hr


-Pain management


-OB/GYN consult appreciated


-f/u urine cx


-f/u AM labs





IUP at 19.5 weeks GA


-OB/GYN consult appreciated


-OB US results reviewed


-c/w prenatal vitamins





Anemia


-stable


-H&H 10.8/31.8


-patient does not take iron due to SE of constipation


-f/u cbc





DVT prophylaxis:


SCDs


Encourage ambulation





GI prophylaxis


Protonix 40 mg po daily





Code Status


-Full code





Patient seen, examined and plan d/w with Dr. Skyler White, [id number]

## 2018-12-11 NOTE — CP.PCM.PN
Subjective





- Date & Time of Evaluation


Date of Evaluation: 12/11/18


Time of Evaluation: 08:57





Objective





- Vital Signs/Intake and Output


Vital Signs (last 24 hours): 


                                        











Temp Pulse Resp BP Pulse Ox


 


 97.5 F L  61   18   97/62 L  100 


 


 12/11/18 08:14  12/11/18 08:14  12/11/18 08:14  12/11/18 08:14  12/11/18 08:14











- Medications


Medications: 


                               Current Medications





Acetaminophen (Tylenol 325mg Tab)  650 mg PO Q6 PRN


   PRN Reason: Pain, moderate (4-7)


Sodium Chloride (Sodium Chloride 0.9%)  1,000 mls @ 125 mls/hr IV .Q8H RAMSES


   Last Admin: 12/11/18 08:28 Dose:  125 mls/hr


Ceftriaxone Sodium 1 gm/ (Sodium Chloride)  100 mls @ 100 mls/hr IVPB DAILY [facility];

Protocol


   Last Admin: 12/11/18 08:26 Dose:  100 mls/hr


Ondansetron HCl (Zofran Inj)  4 mg IVP Q6 PRN


   PRN Reason: Nausea/Vomiting


Pantoprazole Sodium (Protonix Ec Tab)  40 mg PO DAILY [facility]


   Last Admin: 12/11/18 08:27 Dose:  40 mg


Prenatal Multivit/Folic Acid/Iron (Prenatal)  1 tab PO DAILY [facility]SES


   Last Admin: 12/11/18 08:27 Dose:  1 tab











- Labs


Labs: 


                                        





                                 12/11/18 05:55 





                                 12/11/18 05:55

## 2018-12-11 NOTE — CP.PCM.CON
History of Present Illness





- History of Present Illness


History of Present Illness: 


OBGYN was consulted due to patient report decrease fetal movement.





This is a 21 yo f  19wk with no PMH admitted for Pylonephritis treatment was

complaining of decrease fetal movement today for past 3-4 hours. Otherwise 

patient have no complain she denies contraction, vaginal bleeding, vaginal 

discharge, or any other symptoms of concern. Pt denies Fever chills, chest pain 

sob, abd pain or any other symptoms.











Past Patient History





- Infectious Disease


Hx of Infectious Diseases: None





- Past Medical History & Family History


Past Medical History?: Yes





- Past Social History


Smoking Status: Never Smoked





- CARDIAC


Hx Cardiac Disorders: No





- PULMONARY


Hx Respiratory Disorders: No





- NEUROLOGICAL


Hx Neurological Disorder: No





- HEENT


Hx HEENT Problems: No





- RENAL


Hx Chronic Kidney Disease: No





- ENDOCRINE/METABOLIC


Hx Endocrine Disorders: No





- HEMATOLOGICAL/ONCOLOGICAL


Hx Blood Disorders: Yes (ANEMIA)


Hx Anemia: Yes





- INTEGUMENTARY


Hx Dermatological Problems: No





- MUSCULOSKELETAL/RHEUMATOLOGICAL


Hx Musculoskeletal Disorders: No


Hx Falls: No





- GASTROINTESTINAL


Hx Gastrointestinal Disorders: No





- GENITOURINARY/GYNECOLOGICAL


Hx Genitourinary Disorders: No





- PSYCHIATRIC


Hx Psychophysiologic Disorder: No


Hx Substance Use: No





- SURGICAL HISTORY


Hx Surgeries: Yes


Hx Appendectomy: Yes


Hx Tonsillectomy: Yes





- ANESTHESIA


Hx Anesthesia: Yes


Hx Anesthesia Reactions: No





Meds


Allergies/Adverse Reactions: 


                                    Allergies











Allergy/AdvReac Type Severity Reaction Status Date / Time


 


No Known Allergies Allergy   Verified 18 22:12














- Medications


Medications: 


                               Current Medications





Acetaminophen (Tylenol 325mg Tab)  650 mg PO Q6 PRN


   PRN Reason: Pain, moderate (4-7)


Sodium Chloride (Sodium Chloride 0.9%)  1,000 mls @ 125 mls/hr IV .Q8H RAMSES


   Last Admin: 18 15:35 Dose:  125 mls/hr


Ceftriaxone Sodium 1 gm/ (Sodium Chloride)  100 mls @ 100 mls/hr IVPB DAILY RAMSES;

Protocol


   Last Admin: 18 08:26 Dose:  100 mls/hr


Ondansetron HCl (Zofran Inj)  4 mg IVP Q6 PRN


   PRN Reason: Nausea/Vomiting


Prenatal Multivit/Folic Acid/Iron (Prenatal)  1 tab PO DAILY RAMSES


   Last Admin: 18 08:27 Dose:  1 tab











Physical Exam





- Constitutional


Appears: Well, Non-toxic, No Acute Distress





- Head Exam


Head Exam: ATRAUMATIC, NORMAL INSPECTION, NORMOCEPHALIC





- Eye Exam


Eye Exam: EOMI, Normal appearance, PERRL


Pupil Exam: NORMAL ACCOMODATION, PERRL





- ENT Exam


ENT Exam: Mucous Membranes Moist, Normal Exam





- Neck Exam


Neck exam: Positive for: Normal Inspection





- Respiratory Exam


Respiratory Exam: Clear to Auscultation Bilateral, NORMAL BREATHING PATTERN





- Cardiovascular Exam


Cardiovascular Exam: REGULAR RHYTHM, +S1, +S2





- GI/Abdominal Exam


GI & Abdominal Exam: Normal Bowel Sounds, Soft





- Extremities Exam


Extremities exam: Positive for: normal inspection





- Psychiatric Exam


Psychiatric exam: Normal Affect, Normal Mood





Results





- Vital Signs


Recent Vital Signs: 


                                Last Vital Signs











Temp  97.7 F   18 16:08


 


Pulse  75   18 16:08


 


Resp  18   18 16:08


 


BP  99/63 L  18 16:08


 


Pulse Ox  99   18 16:08














- Labs


Result Diagrams: 


                                 18 05:55





                                 18 05:55


Labs: 


                         Laboratory Results - last 24 hr











  12/10/18 12/10/18 12/10/18





  18:15 18:15 18:15


 


WBC  8.3  


 


RBC  3.46 L  


 


Hgb  10.8 L  


 


Hct  31.3 L  


 


MCV  90.4  D  


 


MCH  31.1 H  


 


MCHC  34.4  


 


RDW  13.6  


 


Plt Count  147  


 


MPV  8.8  


 


Neut % (Auto)  67.7  


 


Lymph % (Auto)  23.0  


 


Mono % (Auto)  8.1  


 


Eos % (Auto)  1.0  


 


Baso % (Auto)  0.2  


 


Neut # (Auto)  5.6  


 


Lymph # (Auto)  1.9  


 


Mono # (Auto)  0.7  


 


Eos # (Auto)  0.1  


 


Baso # (Auto)  0.0  


 


Sodium   137 


 


Potassium   3.6 


 


Chloride   105 


 


Carbon Dioxide   23 


 


Anion Gap   13 


 


BUN   6 L 


 


Creatinine   0.5 L 


 


Est GFR ( Amer)   > 60 


 


Est GFR (Non-Af Amer)   > 60 


 


Random Glucose   76 


 


Calcium   8.7 


 


Total Bilirubin   0.1 L 


 


AST   21 


 


ALT   34 


 


Alkaline Phosphatase   40 


 


Total Protein   6.5 


 


Albumin   3.5 


 


Globulin   3.0 


 


Albumin/Globulin Ratio   1.2 


 


Beta HCG, Quant   > 80326.00 


 


Urine Color   


 


Urine Clarity   


 


Urine pH   


 


Ur Specific Gravity   


 


Urine Protein   


 


Urine Glucose (UA)   


 


Urine Ketones   


 


Urine Blood   


 


Urine Nitrate   


 


Urine Bilirubin   


 


Urine Urobilinogen   


 


Ur Leukocyte Esterase   


 


Urine RBC (Auto)   


 


Urine Microscopic WBC   


 


Ur Squamous Epith Cells   


 


Urine Bacteria   


 


Blood Type    B POSITIVE


 


Antibody Screen    Negative


 


BBK History Checked    Patient has bt














  12/10/18 12/11/18 12/11/18





  18:20 05:55 05:55


 


WBC   6.8 


 


RBC   3.08 L 


 


Hgb   9.7 L 


 


Hct   27.9 L 


 


MCV   90.6 


 


MCH   31.5 H 


 


MCHC   34.8 


 


RDW   13.5 


 


Plt Count   146 


 


MPV   8.9 


 


Neut % (Auto)   56.6 


 


Lymph % (Auto)   31.5 


 


Mono % (Auto)   9.9 


 


Eos % (Auto)   1.7 


 


Baso % (Auto)   0.3 


 


Neut # (Auto)   3.8 


 


Lymph # (Auto)   2.1 


 


Mono # (Auto)   0.7 


 


Eos # (Auto)   0.1 


 


Baso # (Auto)   0.0 


 


Sodium    136


 


Potassium    3.4 L


 


Chloride    109 H


 


Carbon Dioxide    23


 


Anion Gap    7 L


 


BUN    5 L


 


Creatinine    0.5 L


 


Est GFR ( Amer)    > 60


 


Est GFR (Non-Af Amer)    > 60


 


Random Glucose    91


 


Calcium    8.3 L


 


Total Bilirubin   


 


AST   


 


ALT   


 


Alkaline Phosphatase   


 


Total Protein   


 


Albumin   


 


Globulin   


 


Albumin/Globulin Ratio   


 


Beta HCG, Quant   


 


Urine Color  Yellow  


 


Urine Clarity  Turbid  


 


Urine pH  6.0  


 


Ur Specific Gravity  1.021  


 


Urine Protein  100  


 


Urine Glucose (UA)  Neg  


 


Urine Ketones  Negative  


 


Urine Blood  Small  


 


Urine Nitrate  Negative  


 


Urine Bilirubin  Negative  


 


Urine Urobilinogen  2.0 H  


 


Ur Leukocyte Esterase  Large  


 


Urine RBC (Auto)  13 H  


 


Urine Microscopic WBC  781 H  


 


Ur Squamous Epith Cells  3  


 


Urine Bacteria  Rare  


 


Blood Type   


 


Antibody Screen   


 


BBK History Checked   














Assessment & Plan





- Assessment and Plan (Free Text)


Assessment: 





This is a 21 yo f  19wk with no PMH admitted for Pylonephritis treatment was

complaining of decrease fetal movement today for past 3-4 hours.





Pt is not in acute distress. 


Denies any vag discharge/bleeding/contraction.


Physical exam was WNL 


Fetal Doppler + for FHS





Plan


Pt is cleared by OBGYN, Thank you for the consult. Re-consult if needed. 


Continue current treatment

## 2018-12-11 NOTE — US
Date of service: 



12/10/2018



PROCEDURE:  Ultrasound of the Kidneys



HISTORY:

Right flank pain h/o UTI pregnant



COMPARISON:

None available.



TECHNIQUE:

Grayscale imaging was performed.



FINDINGS:



RIGHT KIDNEY:

Measures: 10.0 cm. 



Normal in size, contour and echogenicity. 



No stone, solid mass lesion or hydronephrosis visualized. There is 

mild fullness in the collecting system and renal pelvis.



LEFT KIDNEY:

Measures: 10.3 cm. 



Normal in size, contour and echogenicity. 



No stone, solid mass lesion or hydronephrosis visualized. 



OTHER FINDINGS:

None.



IMPRESSION:

Mild fullness in the right collecting system and renal pelvis which 

is likely physiologic in a pregnant patient. 



No hydronephrosis or nephrolithiasis.

## 2018-12-11 NOTE — CP.PCM.CON
History of Present Illness





- History of Present Illness


History of Present Illness: 





CC: OBGYN was consulted due to patient concern of decreased fetal movement





22 years old  with IUP at EGA 19.6 weeks with PMHx of anemia and 

recurrent UTI admitted for pyelonephritis treatment, who is concern of decreased

fetal movements for the past 6 hours. Patient states that baby is moving, but 

less frequent than days before. Patient denies VB, LOF, uterine contx, abdominal

pain, N/V, HA, chest pain, SOB, fever or chills or other acute medical complaint

at this time. Patient states that she f/u with her prenatal care provider at 

[facility] and she has an appointment next week.





ROS: unremarkable, except as per HPI


Prenatal Provider: [facility]


PMHx: Anemia


FMHx: Mother DM and Bone CA


SURGHx: Tonsillectomy, Appendectomy


OBGYN: Patient denies h/o STI, :  full term , IAB . Patient 

reports LMP 18.


SOCHx: Denies ETOH/Smoking/rec DRUGs


ALLERG: NKA


MEDS: Prenatal VIT





Past Patient History





- Infectious Disease


Hx of Infectious Diseases: None





- Past Medical History & Family History


Past Medical History?: Yes





- Past Social History


Smoking Status: Never Smoked





- CARDIAC


Hx Cardiac Disorders: No





- PULMONARY


Hx Respiratory Disorders: No





- NEUROLOGICAL


Hx Neurological Disorder: No





- HEENT


Hx HEENT Problems: No





- RENAL


Hx Chronic Kidney Disease: No





- ENDOCRINE/METABOLIC


Hx Endocrine Disorders: No





- HEMATOLOGICAL/ONCOLOGICAL


Hx Blood Disorders: Yes (ANEMIA)


Hx Anemia: Yes





- INTEGUMENTARY


Hx Dermatological Problems: No





- MUSCULOSKELETAL/RHEUMATOLOGICAL


Hx Musculoskeletal Disorders: No


Hx Falls: No





- GASTROINTESTINAL


Hx Gastrointestinal Disorders: No





- GENITOURINARY/GYNECOLOGICAL


Hx Genitourinary Disorders: No





- PSYCHIATRIC


Hx Psychophysiologic Disorder: No


Hx Substance Use: No





- SURGICAL HISTORY


Hx Surgeries: Yes


Hx Appendectomy: Yes


Hx Tonsillectomy: Yes





- ANESTHESIA


Hx Anesthesia: Yes


Hx Anesthesia Reactions: No





Meds


Allergies/Adverse Reactions: 


                                    Allergies











Allergy/AdvReac Type Severity Reaction Status Date / Time


 


No Known Allergies Allergy   Verified 18 22:12














- Medications


Medications: 


                               Current Medications





Acetaminophen (Tylenol 325mg Tab)  650 mg PO Q6 PRN


   PRN Reason: Pain, moderate (4-7)


Sodium Chloride (Sodium Chloride 0.9%)  1,000 mls @ 125 mls/hr IV .Q8H RAMSES


   Last Admin: 18 15:35 Dose:  125 mls/hr


Ceftriaxone Sodium 1 gm/ (Sodium Chloride)  100 mls @ 100 mls/hr IVPB DAILY [facility];

Protocol


   Last Admin: 18 08:26 Dose:  100 mls/hr


Ondansetron HCl (Zofran Inj)  4 mg IVP Q6 PRN


   PRN Reason: Nausea/Vomiting


Prenatal Multivit/Folic Acid/Iron (Prenatal)  1 tab PO DAILY RAMSES


   Last Admin: 18 08:27 Dose:  1 tab











Physical Exam





- Constitutional


Appears: Well, No Acute Distress





- Head Exam


Head Exam: ATRAUMATIC, NORMOCEPHALIC





- Eye Exam


Eye Exam: EOMI





- ENT Exam


ENT Exam: Mucous Membranes Moist





- Cardiovascular Exam


Cardiovascular Exam: RRR





- GI/Abdominal Exam


GI & Abdominal Exam: Soft.  absent: Tenderness


Additional comments: 





Gravid abdomen





- Extremities Exam


Additional comments: 





UE pulses 3+ wnl





- Neurological Exam


Neurological exam: Alert, Oriented x3





- Skin


Skin Exam: Normal Color, Warm





Results





- Vital Signs


Recent Vital Signs: 


                                Last Vital Signs











Temp  97.7 F   18 16:08


 


Pulse  75   18 16:08


 


Resp  18   18 16:08


 


BP  99/63 L  18 16:08


 


Pulse Ox  99   18 16:08














- Labs


Result Diagrams: 


                                 18 05:55





                                 18 05:55


Labs: 


                         Laboratory Results - last 24 hr











  18





  05:55 05:55


 


WBC  6.8 


 


RBC  3.08 L 


 


Hgb  9.7 L 


 


Hct  27.9 L 


 


MCV  90.6 


 


MCH  31.5 H 


 


MCHC  34.8 


 


RDW  13.5 


 


Plt Count  146 


 


MPV  8.9 


 


Neut % (Auto)  56.6 


 


Lymph % (Auto)  31.5 


 


Mono % (Auto)  9.9 


 


Eos % (Auto)  1.7 


 


Baso % (Auto)  0.3 


 


Neut # (Auto)  3.8 


 


Lymph # (Auto)  2.1 


 


Mono # (Auto)  0.7 


 


Eos # (Auto)  0.1 


 


Baso # (Auto)  0.0 


 


Sodium   136


 


Potassium   3.4 L


 


Chloride   109 H


 


Carbon Dioxide   23


 


Anion Gap   7 L


 


BUN   5 L


 


Creatinine   0.5 L


 


Est GFR ( Amer)   > 60


 


Est GFR (Non-Af Amer)   > 60


 


Random Glucose   91


 


Calcium   8.3 L














Assessment & Plan





- Assessment and Plan (Free Text)


Assessment: 





22 years old  with IUP at EGA 19.6 weeks with PMH of anemia and recurrent

UTI admitted for pyelonephritis treatment, with c/o decreased frequency in FM. 

Patient denies VB, CONTX, LOF. 


Fetal Doppler recheck confirms a + FHR wnl 145s


Patient is cleared by OBGYN at this time


Thank you for this consult.

## 2018-12-11 NOTE — US
Date of service: 



12/10/2018



PROCEDURE:  OB Pelvic Ultrasound



HISTORY:

2nd trimester pregnancy abd pain and vag bleed



LMP: 07/29/2018



COMPARISON:

None available.



FINDINGS:



UTERUS:

Gestational sac: Single intrauterine gestation.



Fetal Heart rate: 138 bpm.



BPD: 4.44 cm corresponding to 19 weeks and 3 days of gestational age. 



HC: 16.8 cm corresponding to 19 weeks and 3 days of gestational age. 



AC: 14.43 cm corresponding to 19 weeks and 5 days of gestational age. 



FL: 3.23 cm corresponding to 20 weeks and 0 days of gestational age. 



Fetal age (Ultrasound estimated): 19 weeks and 5 days



Delores-gestational hemorrhage: None.



Date of delivery (Ultrasound estimated) : 



Placenta is posterior. 



CERVIX:

Measures 5.0 x 2.9 x 2.6 cm. Long and closed. No cervical abnormality 

seen.



RIGHT OVARY:

Measures 3.1 x 2.1 x 2.4 cm. No mass lesion. Normal flow. 



LEFT OVARY:

Measures  cm. No solid mass. Normal flow. 



FREE FLUID:

None.



OTHER FINDINGS:

None. 



IMPRESSION:

Single live intrauterine gestation with mean gestational age of 19 

weeks and 5 days.  The estimated date of delivery by ultrasound is 

05/01/2019.  The ultrasound dates correspond with the clinical dates.



Please note this is a limited OB ultrasound performed on emergent 

basis.  Dedicated fetal anatomic survey is advised.



A preliminary report was provided by [email or web address].

## 2018-12-12 VITALS
OXYGEN SATURATION: 100 % | SYSTOLIC BLOOD PRESSURE: 101 MMHG | RESPIRATION RATE: 20 BRPM | DIASTOLIC BLOOD PRESSURE: 65 MMHG | TEMPERATURE: 97.9 F

## 2018-12-12 LAB
BASOPHILS # BLD AUTO: 0 K/UL (ref 0–0.2)
BASOPHILS NFR BLD: 0.3 % (ref 0–2)
BUN SERPL-MCNC: 5 MG/DL (ref 7–17)
CALCIUM SERPL-MCNC: 8 MG/DL (ref 8.4–10.2)
EOSINOPHIL # BLD AUTO: 0.1 K/UL (ref 0–0.7)
EOSINOPHIL NFR BLD: 1.8 % (ref 0–4)
ERYTHROCYTE [DISTWIDTH] IN BLOOD BY AUTOMATED COUNT: 13.6 % (ref 11.5–14.5)
GFR NON-AFRICAN AMERICAN: > 60
HGB BLD-MCNC: 9.4 G/DL (ref 12–16)
LYMPHOCYTES # BLD AUTO: 2.3 K/UL (ref 1–4.3)
LYMPHOCYTES NFR BLD AUTO: 30.9 % (ref 20–40)
MCH RBC QN AUTO: 30.8 PG (ref 27–31)
MCHC RBC AUTO-ENTMCNC: 34 G/DL (ref 33–37)
MCV RBC AUTO: 90.7 FL (ref 81–99)
MONOCYTES # BLD: 0.7 K/UL (ref 0–0.8)
MONOCYTES NFR BLD: 9.5 % (ref 0–10)
NEUTROPHILS # BLD: 4.2 K/UL (ref 1.8–7)
NEUTROPHILS NFR BLD AUTO: 57.5 % (ref 50–75)
NRBC BLD AUTO-RTO: 0.1 % (ref 0–0)
PLATELET # BLD: 150 K/UL (ref 130–400)
PMV BLD AUTO: 8.9 FL (ref 7.2–11.7)
RBC # BLD AUTO: 3.05 MIL/UL (ref 3.8–5.2)
WBC # BLD AUTO: 7.3 K/UL (ref 4.8–10.8)

## 2018-12-12 RX ADMIN — PRENATAL VIT W/ FE FUMARATE-FA TAB 27-0.8 MG SCH TAB: 27-0.8 TAB at 09:36

## 2018-12-12 NOTE — CP.PCM.DIS
Provider





- Provider


Date of Admission: 


12/10/18 20:48





Attending physician: 


Yeni Holland MD





Consults: 








12/10/18 20:51


OB/GYN Consult Stat 


   Comment: 


   Consulting Provider: Ranjana Hercules


   Consulting Physician: Ranjana Hercules


   Reason for Consult: pregnancy











Time Spent in preparation of Discharge (in minutes): 30





Diagnosis





- Discharge Diagnosis


(1) Pyelonephritis


Status: Acute   





Hospital Course





- Lab Results


Lab Results: 


                                  Micro Results





12/10/18 18:20   Urine,Clean Catch   Urine Culture - Final


                            Citrobacter Diversus





                             Most Recent Lab Values











WBC  7.3 K/uL (4.8-10.8)   18  05:50    


 


RBC  3.05 Mil/uL (3.80-5.20)  L  18  05:50    


 


Hgb  9.4 g/dL (12.0-16.0)  L  18  05:50    


 


Hct  27.7 % (34.0-47.0)  L  18  05:50    


 


MCV  90.7 fl (81.0-99.0)   18  05:50    


 


MCH  30.8 pg (27.0-31.0)   18  05:50    


 


MCHC  34.0 g/dL (33.0-37.0)   18  05:50    


 


RDW  13.6 % (11.5-14.5)   18  05:50    


 


Plt Count  150 K/uL (130-400)   18  05:50    


 


MPV  8.9 fl (7.2-11.7)   18  05:50    


 


Neut % (Auto)  57.5 % (50.0-75.0)   18  05:50    


 


Lymph % (Auto)  30.9 % (20.0-40.0)   18  05:50    


 


Mono % (Auto)  9.5 % (0.0-10.0)   18  05:50    


 


Eos % (Auto)  1.8 % (0.0-4.0)   18  05:50    


 


Baso % (Auto)  0.3 % (0.0-2.0)   18  05:50    


 


Neut # (Auto)  4.2 K/uL (1.8-7.0)   18  05:50    


 


Lymph # (Auto)  2.3 K/uL (1.0-4.3)   18  05:50    


 


Mono # (Auto)  0.7 K/uL (0.0-0.8)   18  05:50    


 


Eos # (Auto)  0.1 K/uL (0.0-0.7)   18  05:50    


 


Baso # (Auto)  0.0 K/uL (0.0-0.2)   18  05:50    


 


Sodium  137 mmol/l (132-148)   18  05:50    


 


Potassium  3.7 MMOL/L (3.6-5.0)   18  05:50    


 


Chloride  109 mmol/L ()  H  18  05:50    


 


Carbon Dioxide  23 mmol/L (22-30)   18  05:50    


 


Anion Gap  9  (10-20)  L  18  05:50    


 


BUN  5 mg/dl (7-17)  L  18  05:50    


 


Creatinine  0.5 mg/dl (0.7-1.2)  L  18  05:50    


 


Est GFR ( Amer)  > 60   18  05:50    


 


Est GFR (Non-Af Amer)  > 60   18  05:50    


 


Random Glucose  82 mg/dL ()   18  05:50    


 


Calcium  8.0 mg/dL (8.4-10.2)  L  18  05:50    


 


Total Bilirubin  0.1 mg/dl (0.2-1.3)  L  12/10/18  18:15    


 


AST  21 U/L (14-36)   12/10/18  18:15    


 


ALT  34 U/L (9-52)   12/10/18  18:15    


 


Alkaline Phosphatase  40 U/L ()   12/10/18  18:15    


 


Total Protein  6.5 G/DL (6.3-8.2)   12/10/18  18:15    


 


Albumin  3.5 g/dL (3.5-5.0)   12/10/18  18:15    


 


Globulin  3.0 gm/dL (2.2-3.9)   12/10/18  18:15    


 


Albumin/Globulin Ratio  1.2  (1.0-2.1)   12/10/18  18:15    


 


Beta HCG, Quant  > 88073.00 mIU/mL  12/10/18  18:15    


 


Urine Color  Yellow  (YELLOW)   12/10/18  18:20    


 


Urine Clarity  Turbid  (Clear)   12/10/18  18:20    


 


Urine pH  6.0  (5.0-8.0)   12/10/18  18:20    


 


Ur Specific Gravity  1.021  (1.003-1.030)   12/10/18  18:20    


 


Urine Protein  100 mg/dL (NEGATIVE)   12/10/18  18:20    


 


Urine Glucose (UA)  Neg mg/dL (NEGATIVE)   12/10/18  18:    


 


Urine Ketones  Negative mg/dL (NEGATIVE)   12/10/18  18:20    


 


Urine Blood  Small  (NEGATIVE)   12/10/18  18:20    


 


Urine Nitrate  Negative  (NEGATIVE)   12/10/18  18:20    


 


Urine Bilirubin  Negative  (NEGATIVE)   12/10/18  18:    


 


Urine Urobilinogen  2.0 mg/dL (0.2-1.0)  H  12/10/18  18:20    


 


Ur Leukocyte Esterase  Large Vale/uL (Negative)   12/10/18  18:20    


 


Urine RBC (Auto)  13 /hpf (0-3)  H  12/10/18  18:20    


 


Urine Microscopic WBC  781 /hpf (0-5)  H  12/10/18  18:20    


 


Ur Squamous Epith Cells  3 /hpf (0-5)   12/10/18  18:20    


 


Urine Bacteria  Rare  (<OCC)   12/10/18  18:20    


 


Blood Type  B POSITIVE   12/10/18  18:15    


 


Antibody Screen  Negative   12/10/18  18:15    


 


BBK History Checked  Patient has bt   12/10/18  18:15    














- Hospital Course


Hospital Course: 





22 years old  with IUP @19.5 weeks of GA with PMHx anemia presented to 

[facility] ED with c/o intermittent RLQ pain x 5 days with occasional radiation to the

right lower back.  Patient reports she has intermittent dysuria since the 

beginning of pregnancy and was treated with multiple po abx.  Renal US showed 

mild to moderate right hydronephrosis. Patient was admitted for acute 

complicated UTI. Started on ceftriaxone and received two days of abx. UA showed 

elevated WBC at 781 with mild RBCs. Patient did not have fever or leukocytosis 

while in house. Obstetrics US showed normal fetal growth with 138 HR of 19.5 

week fetus. OBGYN was consulted and fetal doppler was used to confirm a positive

fetal heart rate WNL at 145s. She was cleared for d/c by OB/GYN. Urine culture 

returned with citrobacter diversens which was sensitive to keflex. D/c home with

keflex for 12 days BID 500mg. Will follow up at [facility] on the  

when she has an appointment scheduled. Discussed follow up with urologist after 

pregnancy. 





- Date & Time of H&P


Date of H&P: 18


Time of H&P: 11:45





Discharge Exam





- Head Exam


Head Exam: ATRAUMATIC, NORMOCEPHALIC





- Eye Exam


Eye Exam: EOMI, Normal appearance, PERRL





- ENT Exam


ENT Exam: Mucous Membranes Moist, Normal Oropharynx





- Neck Exam


Neck exam: Normal Inspection





- Respiratory Exam


Respiratory Exam: Clear to PA & Lateral, NORMAL BREATHING PATTERN





- Cardiovascular Exam


Cardiovascular Exam: REGULAR RHYTHM, +S1, +S2





- GI/Abdominal Exam


GI & Abdominal Exam: Normal Bowel Sounds, Soft





- Extremities Exam


Extremities exam: normal capillary refill, pedal pulses present





- Back Exam


Back exam: CVA tenderness (R) (improved), NORMAL INSPECTION





- Neurological Exam


Neurological exam: Alert, Oriented x3





- Psychiatric Exam


Psychiatric exam: Normal Affect, Normal Mood





- Skin


Skin Exam: Dry, Normal Color, Warm





Discharge Plan





- Discharge Medications


Prescriptions: 


Cephalexin [Keflex] 500 mg PO Q12 #24 capsule





- Follow Up Plan


Condition: FAIR


Disposition: HOME/ ROUTINE


Instructions:  Pregnancy - The Fifth Month, Pregnancy - The Sixth Month, Urinary

 Tract Infection in Women (DC), Urinary Tract Infection in Men (DC), Dysuria 

(GEN)


Additional Instructions: 


follow up with primary MD and OB 1 week


Referrals: 


[facility] [Outside]


Ranjana Hercules MD [Staff Provider] - 





Clinical Quality Measures





- Date & Time of Discharge Summary


Date of Discharge Summary: 18


Time of Discharge Summary: 11:48

## 2018-12-14 ENCOUNTER — HOSPITAL ENCOUNTER (EMERGENCY)
Dept: HOSPITAL 14 - H.EROB2 | Age: 23
Discharge: HOME | End: 2018-12-14
Payer: MEDICAID

## 2018-12-14 VITALS
RESPIRATION RATE: 18 BRPM | DIASTOLIC BLOOD PRESSURE: 61 MMHG | SYSTOLIC BLOOD PRESSURE: 115 MMHG | TEMPERATURE: 98.6 F | HEART RATE: 75 BPM

## 2018-12-14 DIAGNOSIS — O23.42: Primary | ICD-10-CM

## 2018-12-14 DIAGNOSIS — Z3A.20: ICD-10-CM

## 2018-12-14 NOTE — OBHP
===========================

Datetime: 2018 11:50

===========================

   

IP Adm Impression:  , intrauterine pregnancy

IP Admit Plan:  Discharge home

Admit Comment, IP Provider:   with IUP at 20wks report to the Labor and delivery complaining of p
elvic pressure and pain. She denies any vaginal bleeding or leakage of fluid. She also reports urinar
y frequency and dysuria. Patient was seen and evaluated here with same symptoms about 4 days ago. She
 was prescribed antibiotics for UTI but did not fill it because of issues with insurance.

   OB Hx: , PNC with [facility]

   PMHx: Unremarkable

   O; Afebrile

   Heart: RRR

   Abd: Soft, NT, BS present

   FHR- Present, 145 and regular

   No cotractions

   SVE: 0/0/-3

   Assessment:

   IUP at 20 weeks

   UTI as previously diagnosed

   Clinically Stable.

   Plan:

   Discharge Home

    prescription for UTI and 

   follow up with the OB clinic within 1 week.

   Return to labor and delivery with worsening pain.

   Curt Graham.

EGA AdmitDate IP:  20.0

IP Chief Complaint:  Maternal discomfort